# Patient Record
Sex: MALE | Race: WHITE | NOT HISPANIC OR LATINO | Employment: UNEMPLOYED | URBAN - METROPOLITAN AREA
[De-identification: names, ages, dates, MRNs, and addresses within clinical notes are randomized per-mention and may not be internally consistent; named-entity substitution may affect disease eponyms.]

---

## 2017-08-14 ENCOUNTER — OFFICE VISIT (OUTPATIENT)
Dept: URGENT CARE | Facility: CLINIC | Age: 27
End: 2017-08-14
Payer: COMMERCIAL

## 2017-08-14 VITALS
RESPIRATION RATE: 16 BRPM | BODY MASS INDEX: 25.93 KG/M2 | OXYGEN SATURATION: 98 % | TEMPERATURE: 98.3 F | SYSTOLIC BLOOD PRESSURE: 100 MMHG | HEART RATE: 62 BPM | WEIGHT: 202 LBS | HEIGHT: 74 IN | DIASTOLIC BLOOD PRESSURE: 78 MMHG

## 2017-08-14 DIAGNOSIS — M62.838 TRAPEZIUS MUSCLE SPASM: ICD-10-CM

## 2017-08-14 PROCEDURE — 99202 OFFICE O/P NEW SF 15 MIN: CPT | Performed by: NURSE PRACTITIONER

## 2017-08-14 ASSESSMENT — ENCOUNTER SYMPTOMS
DIZZINESS: 0
BACK PAIN: 0
FEVER: 0
DIARRHEA: 0
NAUSEA: 0
MYALGIAS: 0
VOMITING: 0
NECK PAIN: 0
COUGH: 0
CHILLS: 0
HEADACHES: 0

## 2017-08-14 NOTE — PROGRESS NOTES
"Subjective:      Chris Boudreaux is a 27 y.o. male who presents with Letter for School/Work  Surgical HX reviewed in [facility] and not pertinent to today's visit  History reviewed. No pertinent past medical history.  Current medications reviewed.  Social History   Substance Use Topics   • Smoking status: Never Smoker    • Smokeless tobacco: Not on file      Comment: 4 YEARS AGO   • Alcohol Use: No               HPI  Chief Complaint   Patient presents with   • Letter for School/Work     need medical release for right shoulder, was on wc, denied had to use insurance now work requesting medical release to work   approx a month ago he woke up with right shoulder and neck pain. He was sent to a work comp provider and did some physical therapy and anti inflammatories. He states that the claim was denies hence follow up with UC. He no pain, no numbness or tingling and full ROM. He is not currently taking anything for the pain. No hx of same. No other aggravating or alleviating factors.     Review of Systems   Constitutional: Negative for fever, chills and malaise/fatigue.   Respiratory: Negative for cough.    Gastrointestinal: Negative for nausea, vomiting and diarrhea.   Musculoskeletal: Negative for myalgias, back pain, joint pain and neck pain.   Skin: Negative for rash.   Neurological: Negative for dizziness and headaches.   All other systems reviewed and are negative.         Objective:     /78 mmHg  Pulse 62  Temp(Src) 36.8 °C (98.3 °F)  Resp 16  Ht 1.88 m (6' 2.02\")  Wt 91.627 kg (202 lb)  BMI 25.92 kg/m2  SpO2 98%     Physical Exam   Constitutional: He is oriented to person, place, and time. He appears well-developed and well-nourished. No distress.   HENT:   Head: Normocephalic.   Right Ear: External ear normal.   Left Ear: External ear normal.   Eyes: EOM are normal.   Neck: Normal range of motion.   Pulmonary/Chest: Effort normal. No respiratory distress.   Musculoskeletal: Normal range of motion. "   Full ROM of right shoulder  No tenderness with palpation  Upper extremity strength 5/5 and equal    Neurological: He is alert and oriented to person, place, and time.   Skin: Skin is warm and dry.   Psychiatric: He has a normal mood and affect.   Nursing note and vitals reviewed.              Assessment/Plan:     1. Trapezius muscle spasm       Work release  Discussed if any return of sx - continue PT exercised, NSAIDS and ice    Please make an appointment for follow up with your primary care provider. Return for any change in condition, worsening of symptoms, or any other concerns. Please go to the nearest emergency room for any shortness of breath or chest pain or for any of the emergent conditions we have discussed. Patient states understanding to plan of care and discharge instructions.    Please note that this dictation was created using voice recognition software. I have worked with consultants from the vendor as well as technical experts from [facility] [employer] to optimize the interface. I have made every reasonable attempt to correct obvious errors, but I expect that there are errors of grammar and possibly content that I did not discover before finalizing the note.

## 2017-08-14 NOTE — MR AVS SNAPSHOT
"        Chris Adamkilliancathie   2017 1:45 PM   Office Visit   MRN: [id number]    Department:  [facility]   Dept Phone:  [phone]    Description:  Male : 1990   Provider:  RAKAN Jang           Reason for Visit     Letter for School/Work need medical release for right shoulder, was on wc, denied had to use insurance now work requesting medical release to work      Allergies as of 2017     Allergen Noted Reactions    Pcn [Penicillins] 2010         You were diagnosed with     Trapezius muscle spasm   [[id number]]         Vital Signs     Blood Pressure Pulse Temperature Respirations Height Weight    100/78 mmHg 62 36.8 °C (98.3 °F) 16 1.88 m (6' 2.02\") 91.627 kg (202 lb)    Body Mass Index Oxygen Saturation Smoking Status             25.92 kg/m2 98% Never Smoker          Basic Information     Date Of Birth Sex Race Ethnicity Preferred Language    1990 Male White Unknown English      Health Maintenance     Patient has no pending health maintenance at this time      Current Immunizations     No immunizations on file.      Below and/or attached are the medications your provider expects you to take. Review all of your home medications and newly ordered medications with your provider and/or pharmacist. Follow medication instructions as directed by your provider and/or pharmacist. Please keep your medication list with you and share with your provider. Update the information when medications are discontinued, doses are changed, or new medications (including over-the-counter products) are added; and carry medication information at all times in the event of emergency situations     Allergies:  PCN - (reactions not documented)               Medications  Valid as of: 2017 -  2:04 PM    Generic Name Brand Name Tablet Size Instructions for use    Hydrocodone-Acetaminophen (Tab) VICODIN 5-500 MG Take 1-2 Tabs by mouth every four hours as needed.        .                 "   Medicines prescribed today were sent to:     None      Medication refill instructions:       If your prescription bottle indicates you have medication refills left, it is not necessary to call your provider’s office. Please contact your pharmacy and they will refill your medication.    If your prescription bottle indicates you do not have any refills left, you may request refills at any time through one of the following ways: The online Stroho system (except Urgent Care), by calling your provider’s office, or by asking your pharmacy to contact your provider’s office with a refill request. Medication refills are processed only during regular business hours and may not be available until the next business day. Your provider may request additional information or to have a follow-up visit with you prior to refilling your medication.   *Please Note: Medication refills are assigned a new Rx number when refilled electronically. Your pharmacy may indicate that no refills were authorized even though a new prescription for the same medication is available at the pharmacy. Please request the medicine by name with the pharmacy before contacting your provider for a refill.           Stroho Access Code: [email or web address]  Expires: 9/13/2017  2:04 PM    Stroho  A secure, online tool to manage your health information     [employer]’s Stroho® is a secure, online tool that connects you to your personalized health information from the privacy of your home -- day or night - making it very easy for you to manage your healthcare. Once the activation process is completed, you can even access your medical information using the Stroho power, which is available for free in the [employer] Power store or Google Play store.     Stroho provides the following levels of access (as shown below):   My Chart Features   [facility] Doctor [facility]  Specialists [facility]  Urgent  Care Non-[facility]  Primary Care  Doctor   Email your healthcare team  securely and privately 24/7 X X X    Manage appointments: schedule your next appointment; view details of past/upcoming appointments X      Request prescription refills. X      View recent personal medical records, including lab and immunizations X X X X   View health record, including health history, allergies, medications X X X X   Read reports about your outpatient visits, procedures, consult and ER notes X X X X   See your discharge summary, which is a recap of your hospital and/or ER visit that includes your diagnosis, lab results, and care plan. X X       How to register for frintit:  1. Go to  [email or web address].  2. Click on the Sign Up Now box, which takes you to the New Member Sign Up page. You will need to provide the following information:  a. Enter your frintit Access Code exactly as it appears at the top of this page. (You will not need to use this code after you’ve completed the sign-up process. If you do not sign up before the expiration date, you must request a new code.)   b. Enter your date of birth.   c. Enter your home email address.   d. Click Submit, and follow the next screen’s instructions.  3. Create a frintit ID. This will be your frintit login ID and cannot be changed, so think of one that is secure and easy to remember.  4. Create a frintit password. You can change your password at any time.  5. Enter your Password Reset Question and Answer. This can be used at a later time if you forget your password.   6. Enter your e-mail address. This allows you to receive e-mail notifications when new information is available in frintit.  7. Click Sign Up. You can now view your health information.    For assistance activating your frintit account, call [phone]

## 2017-08-14 NOTE — Clinical Note
August 14, 2017         Patient: Chris Boudreaux   YOB: 1990   Date of Visit: 8/14/2017           To Whom it May Concern:    Chris Boudreaux was seen in my clinic on 8/14/2017. He may return to work on 8/14/17 without restriction..    If you have any questions or concerns, please don't hesitate to call.        Sincerely,           Taylor Vaughn A.P.N.  Electronically Signed

## 2021-01-13 ENCOUNTER — NON-PROVIDER VISIT (OUTPATIENT)
Dept: URGENT CARE | Facility: CLINIC | Age: 31
End: 2021-01-13

## 2021-01-13 DIAGNOSIS — Z02.1 PRE-EMPLOYMENT DRUG SCREENING: ICD-10-CM

## 2021-01-13 LAB
AMP AMPHETAMINE: NORMAL
COC COCAINE: NORMAL
INT CON NEG: NORMAL
INT CON POS: NORMAL
MET METHAMPHETAMINES: NORMAL
OPI OPIATES: NORMAL
PCP PHENCYCLIDINE: NORMAL
POC DRUG COMMENT 753798-OCCUPATIONAL HEALTH: NORMAL
THC: NORMAL

## 2021-01-13 PROCEDURE — 80305 DRUG TEST PRSMV DIR OPT OBS: CPT | Performed by: NURSE PRACTITIONER

## 2021-01-21 ENCOUNTER — NON-PROVIDER VISIT (OUTPATIENT)
Dept: OCCUPATIONAL MEDICINE | Facility: CLINIC | Age: 31
End: 2021-01-21

## 2021-01-21 DIAGNOSIS — Z02.83 ENCOUNTER FOR DRUG SCREENING: ICD-10-CM

## 2021-01-21 PROCEDURE — 8911 PR MRO FEE: Performed by: NURSE PRACTITIONER

## 2021-08-25 ENCOUNTER — TELEPHONE (OUTPATIENT)
Dept: SCHEDULING | Facility: IMAGING CENTER | Age: 31
End: 2021-08-25

## 2021-09-30 ENCOUNTER — OFFICE VISIT (OUTPATIENT)
Dept: MEDICAL GROUP | Facility: MEDICAL CENTER | Age: 31
End: 2021-09-30
Attending: NURSE PRACTITIONER
Payer: MEDICAID

## 2021-09-30 VITALS
HEIGHT: 75 IN | OXYGEN SATURATION: 97 % | SYSTOLIC BLOOD PRESSURE: 102 MMHG | HEART RATE: 68 BPM | WEIGHT: 207.3 LBS | BODY MASS INDEX: 25.78 KG/M2 | DIASTOLIC BLOOD PRESSURE: 68 MMHG | TEMPERATURE: 97.6 F | RESPIRATION RATE: 16 BRPM

## 2021-09-30 DIAGNOSIS — G89.29 CHRONIC PAIN OF BOTH SHOULDERS: ICD-10-CM

## 2021-09-30 DIAGNOSIS — Z13.6 SCREENING FOR CARDIOVASCULAR CONDITION: ICD-10-CM

## 2021-09-30 DIAGNOSIS — Z11.3 ROUTINE SCREENING FOR STI (SEXUALLY TRANSMITTED INFECTION): ICD-10-CM

## 2021-09-30 DIAGNOSIS — M54.6 CHRONIC BILATERAL THORACIC BACK PAIN: ICD-10-CM

## 2021-09-30 DIAGNOSIS — M25.511 CHRONIC PAIN OF BOTH SHOULDERS: ICD-10-CM

## 2021-09-30 DIAGNOSIS — M25.571 CHRONIC PAIN OF RIGHT ANKLE: ICD-10-CM

## 2021-09-30 DIAGNOSIS — R10.9 STOMACH PAIN: ICD-10-CM

## 2021-09-30 DIAGNOSIS — Z13.228 SCREENING FOR METABOLIC DISORDER: ICD-10-CM

## 2021-09-30 DIAGNOSIS — Z76.89 ENCOUNTER TO ESTABLISH CARE: ICD-10-CM

## 2021-09-30 DIAGNOSIS — G89.29 CHRONIC PAIN OF RIGHT ANKLE: ICD-10-CM

## 2021-09-30 DIAGNOSIS — G89.29 CHRONIC BILATERAL THORACIC BACK PAIN: ICD-10-CM

## 2021-09-30 DIAGNOSIS — M54.2 NECK PAIN: ICD-10-CM

## 2021-09-30 DIAGNOSIS — M25.512 CHRONIC PAIN OF BOTH SHOULDERS: ICD-10-CM

## 2021-09-30 PROCEDURE — 99212 OFFICE O/P EST SF 10 MIN: CPT | Performed by: NURSE PRACTITIONER

## 2021-09-30 PROCEDURE — 99204 OFFICE O/P NEW MOD 45 MIN: CPT | Performed by: NURSE PRACTITIONER

## 2021-09-30 RX ORDER — OMEPRAZOLE 20 MG/1
20 TABLET, DELAYED RELEASE ORAL DAILY
Qty: 30 TABLET | Refills: 2 | Status: SHIPPED | OUTPATIENT
Start: 2021-09-30 | End: 2021-10-21

## 2021-09-30 ASSESSMENT — ENCOUNTER SYMPTOMS
SHORTNESS OF BREATH: 0
FEVER: 0
PALPITATIONS: 0
CHILLS: 0
ABDOMINAL PAIN: 1
HEARTBURN: 1
COUGH: 0
DIARRHEA: 0
VOMITING: 0
NAUSEA: 0
WHEEZING: 0
WEIGHT LOSS: 0
CONSTIPATION: 0
BLOOD IN STOOL: 1

## 2021-09-30 ASSESSMENT — PATIENT HEALTH QUESTIONNAIRE - PHQ9: CLINICAL INTERPRETATION OF PHQ2 SCORE: 0
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
## 2021-09-30 NOTE — ASSESSMENT & PLAN NOTE
He reports that he woke up a year ago with anterior right ankle pain and tension.  He did not injure the joint.  He limps always now- some days worse than others.  He denies swelling, redness, warmth.  He feels the anterior tension with flexion of the ankle and medial rotation.
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
## 2021-09-30 NOTE — ASSESSMENT & PLAN NOTE
Started about 6 months ago with a burning abd pain.  He reports that spicy or heavy seasoning will cause the burning in his stomach.  He will sometimes have BRBPR when wiping.  He has 4-5 BMs per day.  He has black tarry stool in the past. He has not tried any treatment.

## 2021-10-01 ENCOUNTER — HOSPITAL ENCOUNTER (OUTPATIENT)
Dept: RADIOLOGY | Facility: MEDICAL CENTER | Age: 31
End: 2021-10-01
Attending: NURSE PRACTITIONER
Payer: MEDICAID

## 2021-10-01 DIAGNOSIS — M54.6 CHRONIC BILATERAL THORACIC BACK PAIN: ICD-10-CM

## 2021-10-01 DIAGNOSIS — M25.512 CHRONIC PAIN OF BOTH SHOULDERS: ICD-10-CM

## 2021-10-01 DIAGNOSIS — G89.29 CHRONIC PAIN OF RIGHT ANKLE: ICD-10-CM

## 2021-10-01 DIAGNOSIS — G89.29 CHRONIC PAIN OF BOTH SHOULDERS: ICD-10-CM

## 2021-10-01 DIAGNOSIS — M54.2 NECK PAIN: ICD-10-CM

## 2021-10-01 DIAGNOSIS — G89.29 CHRONIC BILATERAL THORACIC BACK PAIN: ICD-10-CM

## 2021-10-01 DIAGNOSIS — M25.511 CHRONIC PAIN OF BOTH SHOULDERS: ICD-10-CM

## 2021-10-01 DIAGNOSIS — M25.571 CHRONIC PAIN OF RIGHT ANKLE: ICD-10-CM

## 2021-10-01 PROCEDURE — 72040 X-RAY EXAM NECK SPINE 2-3 VW: CPT

## 2021-10-01 PROCEDURE — 73030 X-RAY EXAM OF SHOULDER: CPT | Mod: LT

## 2021-10-01 PROCEDURE — 73600 X-RAY EXAM OF ANKLE: CPT | Mod: RT

## 2021-10-01 PROCEDURE — 73030 X-RAY EXAM OF SHOULDER: CPT | Mod: RT

## 2021-10-01 PROCEDURE — 72072 X-RAY EXAM THORAC SPINE 3VWS: CPT

## 2021-10-07 NOTE — RESULT ENCOUNTER NOTE
Ramírez Perez-     I reviewed all of your x-rays-they are normal.  I think we should start with physical therapy and then consider an MRI if you do not see improvement after your 6 sessions of physical therapy.Amanda    Call to schedule with physical therapy-[facility]  [address]  [address]  [facility] [place] [address]  Phone: [phone]  Fax: [phone]

## 2021-10-07 NOTE — RESULT ENCOUNTER NOTE
Ramírez Perez-       Your neck x-ray shows some very mild arthritis where your your cervical (neck) spine meets your thoracic spine.  Physical therapy would be a good first choice for this.Amanda

## 2021-10-17 ENCOUNTER — PATIENT MESSAGE (OUTPATIENT)
Dept: MEDICAL GROUP | Facility: MEDICAL CENTER | Age: 31
End: 2021-10-17

## 2021-10-17 DIAGNOSIS — M54.2 NECK PAIN: ICD-10-CM

## 2021-10-20 RX ORDER — CYCLOBENZAPRINE HCL 5 MG
5-10 TABLET ORAL 3 TIMES DAILY PRN
Qty: 60 TABLET | Refills: 2 | Status: SHIPPED | OUTPATIENT
Start: 2021-10-20 | End: 2021-11-18 | Stop reason: SDUPTHER

## 2021-10-21 ENCOUNTER — OFFICE VISIT (OUTPATIENT)
Dept: URGENT CARE | Facility: CLINIC | Age: 31
End: 2021-10-21
Payer: MEDICAID

## 2021-10-21 VITALS
HEIGHT: 75 IN | WEIGHT: 210 LBS | TEMPERATURE: 97.1 F | BODY MASS INDEX: 26.11 KG/M2 | RESPIRATION RATE: 16 BRPM | DIASTOLIC BLOOD PRESSURE: 74 MMHG | HEART RATE: 64 BPM | SYSTOLIC BLOOD PRESSURE: 116 MMHG | OXYGEN SATURATION: 98 %

## 2021-10-21 DIAGNOSIS — J06.9 URI, ACUTE: ICD-10-CM

## 2021-10-21 LAB
EXTERNAL QUALITY CONTROL: NORMAL
SARS-COV+SARS-COV-2 AG RESP QL IA.RAPID: NEGATIVE

## 2021-10-21 PROCEDURE — 87426 SARSCOV CORONAVIRUS AG IA: CPT | Performed by: NURSE PRACTITIONER

## 2021-10-21 PROCEDURE — 99213 OFFICE O/P EST LOW 20 MIN: CPT | Performed by: NURSE PRACTITIONER

## 2021-10-21 RX ORDER — OMEPRAZOLE 20 MG/1
CAPSULE, DELAYED RELEASE ORAL
COMMUNITY
Start: 2021-10-05 | End: 2022-09-29

## 2021-10-21 ASSESSMENT — ENCOUNTER SYMPTOMS
CHILLS: 0
FEVER: 0
COUGH: 1

## 2021-10-21 NOTE — PROGRESS NOTES
Subjective     Chris Boudreaux is a 31 y.o. male who presents with Coronavirus Screening (pt has loss of  taste and smell, fever, congestion x 8 days )    No past medical history on file.  Social History     Socioeconomic History   • Marital status: Single     Spouse name: Not on file   • Number of children: Not on file   • Years of education: Not on file   • Highest education level: Not on file   Occupational History   • Not on file   Tobacco Use   • Smoking status: Never Smoker   • Smokeless tobacco: Never Used   • Tobacco comment: 4 YEARS AGO   Vaping Use   • Vaping Use: Never used   Substance and Sexual Activity   • Alcohol use: No     Comment: occ.   • Drug use: Yes     Frequency: 7.0 times per week     Types: Marijuana, Inhaled, Oral   • Sexual activity: Yes     Partners: Female     Birth control/protection: Condom   Other Topics Concern   • Not on file   Social History Narrative   • Not on file     Social Determinants of Health     Financial Resource Strain:    • Difficulty of Paying Living Expenses:    Food Insecurity:    • Worried About Running Out of Food in the Last Year:    • Ran Out of Food in the Last Year:    Transportation Needs:    • Lack of Transportation (Medical):    • Lack of Transportation (Non-Medical):    Physical Activity:    • Days of Exercise per Week:    • Minutes of Exercise per Session:    Stress:    • Feeling of Stress :    Social Connections:    • Frequency of Communication with Friends and Family:    • Frequency of Social Gatherings with Friends and Family:    • Attends Muslim Services:    • Active Member of Clubs or Organizations:    • Attends Club or Organization Meetings:    • Marital Status:    Intimate Partner Violence:    • Fear of Current or Ex-Partner:    • Emotionally Abused:    • Physically Abused:    • Sexually Abused:      Family History   Problem Relation Age of Onset   • Multiple Sclerosis Mother         possibly   • Multiple Sclerosis Father    • Parkinson's  "Disease Father    • Stroke Maternal Grandmother    • Diabetes Maternal Grandfather    • Diabetes Paternal Grandfather    • Cancer Neg Hx        Allergies: Pcn [penicillins]    Patient is a 31-year-old male who presents today with complaint of upper respiratory symptoms including nasal congestion, cough, fatigue and malaise.  He has also noted loss of taste and smell.  Onset of symptoms a days ago.  He is requesting test for Covid.          URI   This is a new problem. The current episode started 1 to 4 weeks ago. The problem has been unchanged. There has been no fever. Associated symptoms include congestion and coughing. Associated symptoms comments: Loss of taste and smell. He has tried nothing for the symptoms. The treatment provided no relief.       Review of Systems   Constitutional: Positive for malaise/fatigue. Negative for chills and fever.   HENT: Positive for congestion.    Respiratory: Positive for cough.    Skin: Negative.    All other systems reviewed and are negative.             Objective     /74 (BP Location: Right arm, Patient Position: Sitting, BP Cuff Size: Adult)   Pulse 64   Temp 36.2 °C (97.1 °F) (Temporal)   Resp 16   Ht 1.905 m (6' 3\")   Wt 95.3 kg (210 lb)   SpO2 98%   BMI 26.25 kg/m²      Physical Exam  Vitals reviewed.   Constitutional:       Appearance: Normal appearance.   HENT:      Head: Normocephalic.      Right Ear: Tympanic membrane, ear canal and external ear normal.      Left Ear: Tympanic membrane, ear canal and external ear normal.      Nose: Congestion present.      Mouth/Throat:      Mouth: Mucous membranes are moist.   Eyes:      Extraocular Movements: Extraocular movements intact.      Conjunctiva/sclera: Conjunctivae normal.      Pupils: Pupils are equal, round, and reactive to light.   Cardiovascular:      Rate and Rhythm: Normal rate and regular rhythm.      Heart sounds: Normal heart sounds.   Pulmonary:      Effort: Pulmonary effort is normal.      Breath " sounds: Normal breath sounds.   Musculoskeletal:         General: Normal range of motion.      Cervical back: Normal range of motion and neck supple.   Skin:     General: Skin is warm and dry.   Neurological:      Mental Status: He is alert and oriented to person, place, and time.   Psychiatric:         Mood and Affect: Mood normal.         Behavior: Behavior normal.                  poct covid: negative            Assessment & Plan   Viral URI    Over-the-counter cough/cold medication of choice  Push fluids  Tylenol/Motrin as needed  Return to urgent care for worsening of symptoms  Patient declined confirmatory Covid PCR at this time     There are no diagnoses linked to this encounter.

## 2021-11-01 ENCOUNTER — PHYSICAL THERAPY (OUTPATIENT)
Dept: PHYSICAL THERAPY | Facility: REHABILITATION | Age: 31
End: 2021-11-01
Attending: NURSE PRACTITIONER
Payer: MEDICAID

## 2021-11-01 DIAGNOSIS — M54.6 CHRONIC BILATERAL THORACIC BACK PAIN: ICD-10-CM

## 2021-11-01 DIAGNOSIS — M25.512 CHRONIC PAIN OF BOTH SHOULDERS: ICD-10-CM

## 2021-11-01 DIAGNOSIS — M54.2 NECK PAIN: ICD-10-CM

## 2021-11-01 DIAGNOSIS — G89.29 CHRONIC PAIN OF BOTH SHOULDERS: ICD-10-CM

## 2021-11-01 DIAGNOSIS — M25.571 CHRONIC PAIN OF RIGHT ANKLE: ICD-10-CM

## 2021-11-01 DIAGNOSIS — M25.511 CHRONIC PAIN OF BOTH SHOULDERS: ICD-10-CM

## 2021-11-01 DIAGNOSIS — G89.29 CHRONIC BILATERAL THORACIC BACK PAIN: ICD-10-CM

## 2021-11-01 DIAGNOSIS — G89.29 CHRONIC PAIN OF RIGHT ANKLE: ICD-10-CM

## 2021-11-01 PROCEDURE — 97162 PT EVAL MOD COMPLEX 30 MIN: CPT

## 2021-11-01 SDOH — ECONOMIC STABILITY: GENERAL: QUALITY OF LIFE: FAIR

## 2021-11-01 ASSESSMENT — ENCOUNTER SYMPTOMS
QUALITY: BURNING
ALLEVIATING FACTORS: REST
EXACERBATED BY: SLEEPING
EXACERBATED BY: ACTIVITY
EXACERBATED BY: STANDING
QUALITY: SHARP
QUALITY: ACHING
ALLEVIATING FACTORS: HEAT APPLICATION
PAIN TIMING: CONSTANT
PAIN SCALE AT LOWEST: 4
PAIN LOCATION: NECK
EXACERBATED BY: RAPID POSITION CHANGES
EXACERBATED BY: GRIPPING
QUALITY: NUMBNESS
EXACERBATED BY: CARRYING
QUALITY: TINGLING
PAIN TIMING: ALL DAY
PAIN SCALE AT HIGHEST: 10
EXACERBATED BY: HOUSEWORK
PAIN SCALE: 7
EXACERBATED BY: MOVEMENT
EXACERBATED BY: OVERHEAD ACTIVITY
EXACERBATED BY: LIFTING
EXACERBATED BY: DRESSING
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
## 2021-11-03 DIAGNOSIS — R29.898 RIGHT ARM WEAKNESS: ICD-10-CM

## 2021-11-03 DIAGNOSIS — M54.2 NECK PAIN: ICD-10-CM
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
## 2021-11-11 ENCOUNTER — PHYSICAL THERAPY (OUTPATIENT)
Dept: PHYSICAL THERAPY | Facility: REHABILITATION | Age: 31
End: 2021-11-11
Attending: NURSE PRACTITIONER
Payer: MEDICAID

## 2021-11-11 DIAGNOSIS — G89.29 CHRONIC PAIN OF BOTH SHOULDERS: ICD-10-CM

## 2021-11-11 DIAGNOSIS — G89.29 CHRONIC BILATERAL THORACIC BACK PAIN: ICD-10-CM

## 2021-11-11 DIAGNOSIS — M54.2 NECK PAIN: ICD-10-CM

## 2021-11-11 DIAGNOSIS — M54.6 CHRONIC BILATERAL THORACIC BACK PAIN: ICD-10-CM

## 2021-11-11 DIAGNOSIS — M25.511 CHRONIC PAIN OF BOTH SHOULDERS: ICD-10-CM

## 2021-11-11 DIAGNOSIS — M25.512 CHRONIC PAIN OF BOTH SHOULDERS: ICD-10-CM

## 2021-11-11 PROCEDURE — 97110 THERAPEUTIC EXERCISES: CPT

## 2021-11-11 PROCEDURE — 97014 ELECTRIC STIMULATION THERAPY: CPT
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
## 2021-11-12 ENCOUNTER — HOSPITAL ENCOUNTER (OUTPATIENT)
Dept: RADIOLOGY | Facility: MEDICAL CENTER | Age: 31
End: 2021-11-12
Attending: NURSE PRACTITIONER
Payer: MEDICAID

## 2021-11-12 DIAGNOSIS — R29.898 RIGHT ARM WEAKNESS: ICD-10-CM

## 2021-11-12 DIAGNOSIS — M54.2 NECK PAIN: ICD-10-CM

## 2021-11-12 PROCEDURE — 72141 MRI NECK SPINE W/O DYE: CPT

## 2021-11-15 ENCOUNTER — APPOINTMENT (OUTPATIENT)
Dept: PHYSICAL THERAPY | Facility: REHABILITATION | Age: 31
End: 2021-11-15
Attending: NURSE PRACTITIONER
Payer: MEDICAID

## 2021-11-15 NOTE — RESULT ENCOUNTER NOTE
Ramírez Perez-I reviewed your MRI. You have some breakdown of the disc between your vertebra at C6-C7 and some enlargement of the joints with in your spine. I would like you to establish with [facility] if you have not already done so. I have placed their information below if you needed.Amanda SALAZAR - [address].  [address] [address]  Phone: [phone]   Fax: [phone]

## 2021-11-18 ENCOUNTER — PHYSICAL THERAPY (OUTPATIENT)
Dept: PHYSICAL THERAPY | Facility: REHABILITATION | Age: 31
End: 2021-11-18
Attending: NURSE PRACTITIONER
Payer: MEDICAID

## 2021-11-18 DIAGNOSIS — M25.511 CHRONIC PAIN OF BOTH SHOULDERS: ICD-10-CM

## 2021-11-18 DIAGNOSIS — G89.29 CHRONIC PAIN OF BOTH SHOULDERS: ICD-10-CM

## 2021-11-18 DIAGNOSIS — M25.512 CHRONIC PAIN OF BOTH SHOULDERS: ICD-10-CM

## 2021-11-18 DIAGNOSIS — M54.6 CHRONIC BILATERAL THORACIC BACK PAIN: ICD-10-CM

## 2021-11-18 DIAGNOSIS — G89.29 CHRONIC BILATERAL THORACIC BACK PAIN: ICD-10-CM

## 2021-11-18 DIAGNOSIS — M54.2 NECK PAIN: ICD-10-CM

## 2021-11-18 PROCEDURE — 97110 THERAPEUTIC EXERCISES: CPT

## 2021-11-18 PROCEDURE — 97014 ELECTRIC STIMULATION THERAPY: CPT

## 2021-11-18 NOTE — OP THERAPY DAILY TREATMENT
Outpatient Physical Therapy  DAILY TREATMENT     [facility] Physical Therapy [address].  [address]  [place] [address]  Phone:  [phone]  Fax:  [phone]    Date: 11/18/2021    Patient: Chris Boudreaux  YOB: 1990  MRN: [id number]     Time Calculation    Start time: 1015  Stop time: 1110 Time Calculation (min): 55 minutes         Chief Complaint: Back Problem and Neck Pain    Visit #: 3    SUBJECTIVE:  Pt reports that yesterday, the muscles on each side of upper thoracic spine were tender and sore due to driving an hour and a half.  States his spine felt looser, less tense after last PT session.  Had R shoulder soreness following PT visit.  Distal symptoms unchanged.  Pt had MRI, see results below.  Referring provider referred pt to [facility].    OBJECTIVE:    MRI on 11/12: C7-T1: Disc desiccation, mild posterior disc bulge and annular fissure. No spinal canal narrowing. There is moderate right neuroforaminal narrowing due to uncovertebral hypertrophy.     Therapeutic Exercises (CPT 93908):     1. Supine cervical retraction, 5sec x10, cues for form, feels pain and tightness at sub occ mm when performing correctly    2. Scap retraction against wall, x10, cues to avoid excess UT use    3. Median nerve glide with ipsi cervical lateral flexion, x10, limited elbow extension in nerve glide, but with ipsilateral neck flexion allows full ROM.    4. Supine wand bicep curls, 10, right thoracic pain between spine and scap    5. Supine wand chest press, x10    6. Supine wand AAROM ER, x10    7. Ball circles at wall, x10R CW/CCW    8. Ball roll up wall- RUE, x8    13. Manual cervical traction GrII, STM B upper 1/2 thoracic paraspinals, UTs, LSs,     14. Thoracic mobs (PAs T3-T7 GrIII)      Therapeutic Exercise Summary: Add median nerve glide to HEP, handout provided.  Reviewed MRI findings, education provided re: anatomy, pathology, using skeletal model, and correlation with  symptoms.    Therapeutic Treatments and Modalities:     1. E Stim Unattended (CPT 76143), IFC and MH to B cervical and upper thoracic spine, x15 min in hooklying    Time-based treatments/modalities:    Physical Therapy Timed Treatment Charges  Therapeutic exercise minutes (CPT 03659): 38 minutes    ASSESSMENT:   Response to treatment: Pt demo fair kristina for today's interventions, minimal distal symptoms provoked.    PLAN/RECOMMENDATIONS:   Plan for treatment: therapy treatment to continue next visit.  Planned interventions for next visit: continue with current treatment.

## 2021-11-30 ENCOUNTER — APPOINTMENT (OUTPATIENT)
Dept: PHYSICAL THERAPY | Facility: REHABILITATION | Age: 31
End: 2021-11-30
Attending: NURSE PRACTITIONER
Payer: MEDICAID

## 2021-12-02 ENCOUNTER — APPOINTMENT (OUTPATIENT)
Dept: PHYSICAL THERAPY | Facility: REHABILITATION | Age: 31
End: 2021-12-02
Attending: NURSE PRACTITIONER
Payer: MEDICAID

## 2021-12-07 ENCOUNTER — PHYSICAL THERAPY (OUTPATIENT)
Dept: PHYSICAL THERAPY | Facility: REHABILITATION | Age: 31
End: 2021-12-07
Attending: NURSE PRACTITIONER
Payer: MEDICAID

## 2021-12-07 DIAGNOSIS — M25.511 CHRONIC PAIN OF BOTH SHOULDERS: ICD-10-CM

## 2021-12-07 DIAGNOSIS — G89.29 CHRONIC PAIN OF BOTH SHOULDERS: ICD-10-CM

## 2021-12-07 DIAGNOSIS — G89.29 CHRONIC BILATERAL THORACIC BACK PAIN: ICD-10-CM

## 2021-12-07 DIAGNOSIS — M25.512 CHRONIC PAIN OF BOTH SHOULDERS: ICD-10-CM

## 2021-12-07 DIAGNOSIS — M54.6 CHRONIC BILATERAL THORACIC BACK PAIN: ICD-10-CM

## 2021-12-07 DIAGNOSIS — M54.2 NECK PAIN: ICD-10-CM

## 2021-12-07 PROCEDURE — 97110 THERAPEUTIC EXERCISES: CPT

## 2021-12-07 PROCEDURE — 97014 ELECTRIC STIMULATION THERAPY: CPT

## 2021-12-07 NOTE — OP THERAPY DAILY TREATMENT
"  Outpatient Physical Therapy  DAILY TREATMENT     [facility] Physical [address].  [address]  [place] [address]  Phone:  [phone]  Fax:  [phone]    Date: 12/07/2021    Patient: Chris Boudreaux  YOB: 1990  MRN: [id number]     Time Calculation    Start time: 0946  Stop time: 1045 Time Calculation (min): 59 minutes         Chief Complaint: Arm Problem and Neck Problem    Visit #: 4    SUBJECTIVE:  The pt states that he is noticing an improvement in his arm symptoms since starting physical therapy. He reports that he is doing his HEP 2x/day. He states that he feels a tingling in his neck and an \"emptiness\" down his arm. He states that he got a referral to the spine specialist, but there is a 90 day waiting list to get an appointment.  He does report that he was ice skating this weekend with his kids and fell and has been feeling some discomfort in his L arm ever since.     OBJECTIVE:          Therapeutic Exercises (CPT 94988):     1. Supine cervical retraction, 2x10    2. Scap retraction against wall, x15     3. Median nerve glide with ipsi cervical lateral flexion, 2x10    4. Supine wand bicep curls, 2x10    5. Supine wand chest press, 2x10    6. Supine shoulder ER AROM, 2x10    7. Ball circles at wall, 30x each CW/CCW    8. Ball roll up wall- RUE, x15, Yellow ball    9. UBE warm up , L2, 5 minutes    10. T-band rows, 2x10, Black t-band    11. B shoulder ER with t-band , Deferred due to L shoulder pain, 1/2 foam roll    13. Manual cervical traction GrII, STM B upper 1/2 thoracic paraspinals, UTs, LSs,     20. POC 10/13/21-12/31/21      Therapeutic Exercise Summary: Add median nerve glide to HEP, handout provided.  Reviewed MRI findings, education provided re: anatomy, pathology, using skeletal model, and correlation with symptoms.    Therapeutic Treatments and Modalities:     1. E Stim Unattended (CPT 83759), IFC and MH to B cervical and upper thoracic spine, x15 min " in hooklying    Time-based treatments/modalities:    Physical Therapy Timed Treatment Charges  Therapeutic exercise minutes (CPT 05220): 38 minutes    ASSESSMENT:   Response to treatment: The pt continues to tolerate a progressive increase in therex for postural mobility and spinal stabilization without aggravation of his neck or radicular symptoms. He is reporting ongoing benefit from participation in home exercises and is noting improvement in his function and symptoms. The pt will continue to benefit from skilled physical therapy intervention to maximize his return to PLOF.      PLAN/RECOMMENDATIONS:   Plan for treatment: therapy treatment to continue next visit.  Planned interventions for next visit: continue with current treatment. Continue to progress postural mobility, nerve tension, and spinal stabilization.

## 2021-12-10 ENCOUNTER — APPOINTMENT (OUTPATIENT)
Dept: PHYSICAL THERAPY | Facility: REHABILITATION | Age: 31
End: 2021-12-10
Attending: NURSE PRACTITIONER
Payer: MEDICAID

## 2021-12-14 ENCOUNTER — APPOINTMENT (OUTPATIENT)
Dept: PHYSICAL THERAPY | Facility: REHABILITATION | Age: 31
End: 2021-12-14
Attending: NURSE PRACTITIONER
Payer: MEDICAID

## 2021-12-14 NOTE — OP THERAPY DAILY TREATMENT
"  Outpatient Physical Therapy  DAILY TREATMENT     [facility] Physical [address].  [address]  [place] [address]  Phone:  [phone]  Fax:  [phone]    Date: 12/14/2021    Patient: Chris Boudreaux  YOB: 1990  MRN: [id number]     Time Calculation                   Chief Complaint: No chief complaint on file.    Visit #: 5    SUBJECTIVE:  ***    OBJECTIVE:  Current objective measures: ***          Therapeutic Exercises (CPT 09194):     1. Supine cervical retraction, 2x10    2. Scap retraction against wall, x15     3. Median nerve glide with ipsi cervical lateral flexion, 2x10    4. Supine wand bicep curls, 2x10    5. Supine wand chest press, 2x10    6. Supine shoulder ER AROM, 2x10    7. Ball circles at wall, 30x each CW/CCW    8. Ball roll up wall- RUE, x15, Yellow ball    9. UBE warm up , L2, 5 minutes    10. T-band rows, 2x10, Black t-band    11. B shoulder ER with t-band , Deferred due to L shoulder pain, 1/2 foam roll    13. Manual cervical traction GrII, STM B upper 1/2 thoracic paraspinals, UTs, LSs,     20. POC 10/13/21-12/31/21      Therapeutic Exercise Summary: Add median nerve glide to HEP, handout provided.  Reviewed MRI findings, education provided re: anatomy, pathology, using skeletal model, and correlation with symptoms.    Therapeutic Treatments and Modalities:     1. E Stim Unattended (CPT 22009), IFC and MH to B cervical and upper thoracic spine, x15 min in hooklying    Time-based treatments/modalities:           Pain rating (1-10) before treatment:  {PAIN NUMBERS_1-10:65305}  Pain rating (1-10) after treatment:  {PAIN NUMBERS_1-10:29788}    ASSESSMENT:   Response to treatment: ***    PLAN/RECOMMENDATIONS:   Plan for treatment: {AMB OP THERAPY - THERAPY PLAN:[id number]::\"therapy treatment to continue next visit\"}.  Planned interventions for next visit: {PT PLANNED THERAPY INTERVENTIONS:[id number]::\"continue with current treatment\"}.       "

## 2021-12-16 ENCOUNTER — PHYSICAL THERAPY (OUTPATIENT)
Dept: PHYSICAL THERAPY | Facility: REHABILITATION | Age: 31
End: 2021-12-16
Attending: NURSE PRACTITIONER
Payer: MEDICAID

## 2021-12-16 DIAGNOSIS — G89.29 CHRONIC BILATERAL THORACIC BACK PAIN: ICD-10-CM

## 2021-12-16 DIAGNOSIS — M25.511 CHRONIC PAIN OF BOTH SHOULDERS: ICD-10-CM

## 2021-12-16 DIAGNOSIS — M54.6 CHRONIC BILATERAL THORACIC BACK PAIN: ICD-10-CM

## 2021-12-16 DIAGNOSIS — G89.29 CHRONIC PAIN OF BOTH SHOULDERS: ICD-10-CM

## 2021-12-16 DIAGNOSIS — M25.512 CHRONIC PAIN OF BOTH SHOULDERS: ICD-10-CM

## 2021-12-16 DIAGNOSIS — M54.2 NECK PAIN: ICD-10-CM

## 2021-12-16 PROCEDURE — 97110 THERAPEUTIC EXERCISES: CPT

## 2021-12-16 PROCEDURE — 97014 ELECTRIC STIMULATION THERAPY: CPT
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
## 2021-12-20 ENCOUNTER — PHYSICAL THERAPY (OUTPATIENT)
Dept: PHYSICAL THERAPY | Facility: REHABILITATION | Age: 31
End: 2021-12-20
Attending: NURSE PRACTITIONER
Payer: MEDICAID

## 2021-12-20 DIAGNOSIS — G89.29 CHRONIC BILATERAL THORACIC BACK PAIN: ICD-10-CM

## 2021-12-20 DIAGNOSIS — M54.2 NECK PAIN: ICD-10-CM

## 2021-12-20 DIAGNOSIS — M25.511 CHRONIC PAIN OF BOTH SHOULDERS: ICD-10-CM

## 2021-12-20 DIAGNOSIS — M25.512 CHRONIC PAIN OF BOTH SHOULDERS: ICD-10-CM

## 2021-12-20 DIAGNOSIS — M54.6 CHRONIC BILATERAL THORACIC BACK PAIN: ICD-10-CM

## 2021-12-20 DIAGNOSIS — G89.29 CHRONIC PAIN OF BOTH SHOULDERS: ICD-10-CM

## 2021-12-20 PROCEDURE — 97110 THERAPEUTIC EXERCISES: CPT

## 2021-12-20 PROCEDURE — 97014 ELECTRIC STIMULATION THERAPY: CPT

## 2021-12-20 NOTE — OP THERAPY DAILY TREATMENT
"  Outpatient Physical Therapy  DAILY TREATMENT     [facility] Physical [address].  [address]  [place] [address]  Phone:  [phone]  Fax:  [phone]    Date: 12/20/2021    Patient: Chris Boudreaux  YOB: 1990  MRN: [id number]     Time Calculation    Start time: 0805  Stop time: 0900 Time Calculation (min): 55 minutes         Chief Complaint: Neck Problem and Shoulder Problem    Visit #: 6    SUBJECTIVE:  The pt states that his R shoulder and neck symptoms are okay today. He states that he re-strained his L shoulder while roller skating with his kids over the weekend. He is agreeable to PT today.     OBJECTIVE:          Therapeutic Exercises (CPT 36016):     1. Supine cervical retraction, 10x    2. Posture angels against wall, 15x    3. Median nerve glide with ipsi cervical lateral flexion, 15x    4. Supine chin tuck to lift off, 10x    5. Supine wand flexion, 15x    6. T-band ER/IR, 2x10, Black t-band, towel under elbow    7. Ball circles at wall, 30x each CW/CCW, flexion and abduction    8. Ball roll up wall- RUE, 15x3\", Yellow ball    9. UBE warm up , L4, 6 minutes switching anterior to posterior every minute    10. T-band rows, 3x10, Black t-band    11. B shoulder ER with t-band , 2x10, Standing, purple T-band, R only    12. T-band lat pulldown, 3x10, Black t-band    13. Manual cervical traction GrII, STM UTs, LSs, , 5'    20. POC 10/13/21-12/31/21      Therapeutic Exercise Summary: Pt educated to continue with HEP.     Therapeutic Treatments and Modalities:     1. E Stim Unattended (CPT 07486), IFC and MH to B cervical and upper thoracic spine, x15 min in hooklying    Time-based treatments/modalities:    Physical Therapy Timed Treatment Charges  Therapeutic exercise minutes (CPT 28863): 38 minutes    ASSESSMENT:   Response to treatment: The pt continues to tolerate a progression in spinal and postural strengthening and stabilization without aggravation of his " "symptoms. He reports that the \"cold\" feeling in his hand is also improving. He demonstrates good adherence to HEP and is making good progress towards PT goals.     PLAN/RECOMMENDATIONS:   Plan for treatment: therapy treatment to continue next visit.  Planned interventions for next visit: continue with current treatment. Continue with postural stabilization and strengthening.        "

## 2021-12-22 ENCOUNTER — PHYSICAL THERAPY (OUTPATIENT)
Dept: PHYSICAL THERAPY | Facility: REHABILITATION | Age: 31
End: 2021-12-22
Attending: NURSE PRACTITIONER
Payer: MEDICAID

## 2021-12-22 DIAGNOSIS — M25.511 CHRONIC PAIN OF BOTH SHOULDERS: ICD-10-CM

## 2021-12-22 DIAGNOSIS — M25.512 CHRONIC PAIN OF BOTH SHOULDERS: ICD-10-CM

## 2021-12-22 DIAGNOSIS — G89.29 CHRONIC BILATERAL THORACIC BACK PAIN: ICD-10-CM

## 2021-12-22 DIAGNOSIS — G89.29 CHRONIC PAIN OF BOTH SHOULDERS: ICD-10-CM

## 2021-12-22 DIAGNOSIS — M54.6 CHRONIC BILATERAL THORACIC BACK PAIN: ICD-10-CM

## 2021-12-22 DIAGNOSIS — M54.2 NECK PAIN: ICD-10-CM

## 2021-12-22 PROCEDURE — 97014 ELECTRIC STIMULATION THERAPY: CPT

## 2021-12-22 PROCEDURE — 97110 THERAPEUTIC EXERCISES: CPT

## 2021-12-22 NOTE — OP THERAPY DAILY TREATMENT
"  Outpatient Physical Therapy  DAILY TREATMENT     [facility] Physical Therapy [address]  [address]  [place] [address]  Phone:  [phone]  Fax:  [phone]    Date: 12/22/2021    Patient: Chris Boudreaux  YOB: 1990  MRN: [id number]     Time Calculation    Start time: 0815  Stop time: 0915 Time Calculation (min): 60 minutes         Chief Complaint: Neck Pain and Shoulder Problem    Visit #: 7    SUBJECTIVE:  The pt states that he woke up with significant stiffness in his neck. He tried to crack it but states that it felt \"like the bones were rubbing together\". He is agreeable to PT today.     OBJECTIVE:          Therapeutic Exercises (CPT 53091):     1. Supine cervical retraction, 2x10    2. Posture angels against wall, 10x    3. Median nerve glide with ipsi cervical lateral flexion, 15x    4. Supine chin tuck to lift off, Not today    5. Supine wand flexion, 15x    6. T-band ER/IR, 2x10, Black t-band, towel under elbow    7. Ball circles at wall, Not today    8. Ball roll up wall- RUE, 10x    9. UBE warm up , L4, 6 minutes switching anterior to posterior every minute    10. T-band rows, 3x10, Black t-band    11. B shoulder ER with t-band , 2x10, Standing, green T-band, R only    12. T-band lat pulldown, 3x10, Black t-band    13. Manual cervical traction GrII, STM UTs, LSs, , 5'    14. Swiss ball t-s mobs, 2 min    20. POC 10/13/21-12/31/21      Therapeutic Exercise Summary: Pt educated to continue with HEP.     Therapeutic Treatments and Modalities:     1. E Stim Unattended (CPT 27688), IFC and MH to B cervical and upper thoracic spine, x15 min in hooklying    Time-based treatments/modalities:    Physical Therapy Timed Treatment Charges  Therapeutic exercise minutes (CPT 16547): 38 minutes    ASSESSMENT:   Response to treatment: The pt was able to tolerate all therex for postural mobility without an increase in symptoms, despite his aggravation of pain upon arriving to " today's session. His postural strength and B shoulder stability has improved since the start of care.     PLAN/RECOMMENDATIONS:   Plan for treatment: therapy treatment to continue next visit.  Planned interventions for next visit: continue with current treatment. Continue with postural mobility and stabilization.

## 2021-12-24 DIAGNOSIS — M54.2 NECK PAIN: ICD-10-CM

## 2021-12-27 ENCOUNTER — TELEPHONE (OUTPATIENT)
Dept: MEDICAL GROUP | Facility: MEDICAL CENTER | Age: 31
End: 2021-12-27

## 2021-12-27 ENCOUNTER — APPOINTMENT (OUTPATIENT)
Dept: PHYSICAL THERAPY | Facility: REHABILITATION | Age: 31
End: 2021-12-27
Attending: NURSE PRACTITIONER
Payer: MEDICAID

## 2021-12-27 RX ORDER — CYCLOBENZAPRINE HCL 5 MG
5-10 TABLET ORAL 3 TIMES DAILY PRN
Qty: 90 TABLET | Refills: 5 | Status: SHIPPED | OUTPATIENT
Start: 2021-12-27 | End: 2022-09-29

## 2021-12-28 ENCOUNTER — TELEPHONE (OUTPATIENT)
Dept: MEDICAL GROUP | Facility: MEDICAL CENTER | Age: 31
End: 2021-12-28

## 2021-12-28 NOTE — TELEPHONE ENCOUNTER
DOCUMENTATION OF PAR STATUS:    1. Name of Medication & Dose: cyclobenzaprine (FLEXERIL) 5 mg tablet      2. Name of Prescription Coverage Company & phone #: N MEDICA[employer]    3. Date Prior Auth Submitted: 12/27/21    4. What information was given to obtain insurance decision? Chart notes, icd-10 codes, allergies hx.      5. Prior Auth Status? Pending    6. Patient Notified: N\A

## 2021-12-28 NOTE — TELEPHONE ENCOUNTER
FINAL PRIOR AUTHORIZATION STATUS:    1.  Name of Medication & Dose: Cyclobenzaprine 5 mg     2. Prior Auth Status: CANCELLED    3. Action Taken: Pharmacy Notified: N\A Patient Notified: N\A

## 2021-12-29 ENCOUNTER — APPOINTMENT (OUTPATIENT)
Dept: PHYSICAL THERAPY | Facility: REHABILITATION | Age: 31
End: 2021-12-29
Attending: NURSE PRACTITIONER
Payer: MEDICAID

## 2021-12-30 ENCOUNTER — APPOINTMENT (OUTPATIENT)
Dept: PHYSICAL THERAPY | Facility: REHABILITATION | Age: 31
End: 2021-12-30
Attending: NURSE PRACTITIONER
Payer: MEDICAID

## 2022-02-07 ENCOUNTER — TELEPHONE (OUTPATIENT)
Dept: PHYSICAL THERAPY | Facility: REHABILITATION | Age: 32
End: 2022-02-07

## 2022-02-07 NOTE — OP THERAPY DISCHARGE SUMMARY
Outpatient Physical Therapy  DISCHARGE SUMMARY NOTE      [facility] Physical Therapy [facility]  [address]  [address]  [facility] [address]  Phone:  [phone]  Fax:  [phone]    Date of Visit: 02/07/2022    Patient: Chris Boudreaux  YOB: 1990  MRN: [id number]     Referring Provider: HEATH Mendoza  [address] [address]   Referring Diagnosis Neck pain [M54.2];Chronic pain of right ankle [M25.571, G89.29];Chronic bilateral thoracic back pain [M54.6, G89.29];Chronic pain of both shoulders [M25.511, G89.29, M25.512]           Your patient is being discharged from Physical Therapy with the following comments:   · Patient has failed to schedule or reschedule follow-up visits    Comments:  Chris Boudreaux was seen for 7 PT visits between 11/1/21-12/22/21 for neck and shoulder pain. The pt cancelled remaining PT visits following his last apt on 12/22/21 and did not call to reschedule. The pt will be discharged at this time due to the time since his last PT visit.     Limitations Remaining:  Unknown, pt's last visit 12/22/21.     Recommendations:  Recommend that pt request a new PT referral if further PT intervention is needed.     Macy Nettles, PT    Date: 2/7/2022

## 2022-06-27 PROCEDURE — RXMED WILLOW AMBULATORY MEDICATION CHARGE: Performed by: INTERNAL MEDICINE

## 2022-06-29 ENCOUNTER — PHARMACY VISIT (OUTPATIENT)
Dept: PHARMACY | Facility: MEDICAL CENTER | Age: 32
End: 2022-06-29
Payer: COMMERCIAL

## 2022-07-18 ENCOUNTER — PHARMACY VISIT (OUTPATIENT)
Dept: PHARMACY | Facility: MEDICAL CENTER | Age: 32
End: 2022-07-18
Payer: COMMERCIAL

## 2022-07-18 PROCEDURE — RXMED WILLOW AMBULATORY MEDICATION CHARGE: Performed by: INTERNAL MEDICINE

## 2022-07-18 RX ORDER — BNT162B2 0.23 MG/2.25ML
INJECTION, SUSPENSION INTRAMUSCULAR ONCE
Qty: 0.3 ML | Refills: 0 | OUTPATIENT
Start: 2022-07-18 | End: 2022-07-19

## 2022-09-29 ENCOUNTER — APPOINTMENT (OUTPATIENT)
Dept: URGENT CARE | Facility: PHYSICIAN GROUP | Age: 32
End: 2022-09-29
Payer: MEDICAID

## 2022-09-29 ENCOUNTER — OFFICE VISIT (OUTPATIENT)
Dept: URGENT CARE | Facility: CLINIC | Age: 32
End: 2022-09-29
Payer: MEDICAID

## 2022-09-29 VITALS
SYSTOLIC BLOOD PRESSURE: 120 MMHG | DIASTOLIC BLOOD PRESSURE: 78 MMHG | WEIGHT: 232.8 LBS | HEART RATE: 70 BPM | OXYGEN SATURATION: 99 % | RESPIRATION RATE: 12 BRPM | TEMPERATURE: 97.5 F | HEIGHT: 76 IN | BODY MASS INDEX: 28.35 KG/M2

## 2022-09-29 DIAGNOSIS — M25.511 ACUTE PAIN OF RIGHT SHOULDER: ICD-10-CM

## 2022-09-29 PROCEDURE — 99213 OFFICE O/P EST LOW 20 MIN: CPT | Performed by: PHYSICIAN ASSISTANT

## 2022-09-29 NOTE — PROGRESS NOTES
"  Subjective:   Chris Boudreaux is a 32 y.o. male who presents today with   Chief Complaint   Patient presents with    Shoulder Pain     Pt had pain on (R) shoulder        Shoulder Pain  This is a new problem. Episode onset: 4 days. The problem occurs constantly. The problem has been gradually improving. He has tried rest for the symptoms. The treatment provided moderate relief.   Patient has history of cervical spine compression and chronic shoulder pain.  He states it will occasionally flareup and since Monday he has had a flareup but it has significantly improved and he is ready to go back to work and is requesting a note to go back to work at this time.  No recent injury or trauma.  Patient did require physical therapy for this in the past but states it was not really helping much.    PMH:  has no past medical history on file.  MEDS: No current outpatient medications on file.  ALLERGIES:   Allergies   Allergen Reactions    Pcn [Penicillins]      SURGHX: History reviewed. No pertinent surgical history.  SOCHX:  reports that he has never smoked. He has never used smokeless tobacco. He reports that he does not currently use drugs after having used the following drugs: Marijuana, Inhaled, and Oral. Frequency: 7.00 times per week. He reports that he does not drink alcohol.  FH: Reviewed with patient, not pertinent to this visit.       Review of Systems   Musculoskeletal:         Right shoulder pain      Objective:   /78 (BP Location: Left arm, Patient Position: Sitting, BP Cuff Size: Large adult)   Pulse 70   Temp 36.4 °C (97.5 °F) (Temporal)   Resp 12   Ht 1.93 m (6' 4\")   Wt 106 kg (232 lb 12.8 oz)   SpO2 99%   BMI 28.34 kg/m²   Physical Exam  Vitals and nursing note reviewed.   Constitutional:       General: He is not in acute distress.     Appearance: Normal appearance. He is well-developed. He is not ill-appearing or toxic-appearing.   HENT:      Head: Normocephalic and atraumatic.      Right " Ear: Hearing normal.      Left Ear: Hearing normal.   Cardiovascular:      Rate and Rhythm: Normal rate and regular rhythm.      Heart sounds: Normal heart sounds.   Pulmonary:      Effort: Pulmonary effort is normal.      Breath sounds: Normal breath sounds.   Musculoskeletal:      Cervical back: Normal range of motion. Muscular tenderness present. No spinous process tenderness.      Comments: Patient has full strength, range of motion of the right upper extremity.  No bony tenderness to palpation of the shoulder.  Mild tightness to the shoulder but no redness or erythema to the skin.  Neurovascular intact distally.5/5  strength.   Skin:     General: Skin is warm and dry.   Neurological:      Mental Status: He is alert.      Coordination: Coordination normal.   Psychiatric:         Mood and Affect: Mood normal.       Assessment/Plan:   Assessment    1. Acute pain of right shoulder  Times and presentation are consistent with acute flareup of chronic right shoulder pain.  RICE TREATMENT FOR EXTREMITY INJURIES:  R-rest the extremity as much as possible while pain and swelling persist  I-ice the extremity 15 minutes every 2 hours for the first 24 hours, then 4-5 times daily   C-compress the extremity either with splint or ace wrap as directed  E-elevate the extremity to help with swelling    Differential diagnosis, natural history, supportive care, and indications for immediate follow-up discussed.   Patient given instructions and understanding of medications and treatment.    If not improving in 3-5 days, F/U with PCP or return to  if symptoms worsen.    Patient agreeable to plan.      Please note that this dictation was created using voice recognition software. I have made every reasonable attempt to correct obvious errors, but I expect that there are errors of grammar and possibly content that I did not discover before finalizing the note.    Bruno Altamirano PA-C

## 2022-09-29 NOTE — LETTER
September 29, 2022         Patient: Chris Boudreaux   YOB: 1990   Date of Visit: 9/29/2022           To Whom it May Concern:    Chris Boudreaux was seen in my clinic on 9/29/2022.  Please excuse patient's absence.  He can return to work 9/30/2022.    If you have any questions or concerns, please don't hesitate to call.        Sincerely,           Bruno Altamirano P.A.-C.  Electronically Signed

## 2024-11-02 ENCOUNTER — HOSPITAL ENCOUNTER (OUTPATIENT)
Facility: MEDICAL CENTER | Age: 34
End: 2024-11-02
Attending: STUDENT IN AN ORGANIZED HEALTH CARE EDUCATION/TRAINING PROGRAM | Admitting: ORTHOPAEDIC SURGERY

## 2024-11-02 ENCOUNTER — APPOINTMENT (OUTPATIENT)
Dept: RADIOLOGY | Facility: MEDICAL CENTER | Age: 34
End: 2024-11-02
Attending: STUDENT IN AN ORGANIZED HEALTH CARE EDUCATION/TRAINING PROGRAM

## 2024-11-02 VITALS
DIASTOLIC BLOOD PRESSURE: 81 MMHG | HEIGHT: 76 IN | WEIGHT: 230 LBS | OXYGEN SATURATION: 97 % | BODY MASS INDEX: 28.01 KG/M2 | TEMPERATURE: 97.9 F | RESPIRATION RATE: 16 BRPM | HEART RATE: 58 BPM | SYSTOLIC BLOOD PRESSURE: 134 MMHG
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
## 2024-11-02 DIAGNOSIS — R20.0 LEFT UPPER EXTREMITY NUMBNESS: ICD-10-CM

## 2024-11-02 DIAGNOSIS — S40.022A HEMATOMA OF LEFT UPPER EXTREMITY: ICD-10-CM

## 2024-11-02 DIAGNOSIS — R29.898 WEAKNESS OF LEFT UPPER EXTREMITY: ICD-10-CM

## 2024-11-02 LAB
ABO + RH BLD: NORMAL
ABO GROUP BLD: NORMAL
ALBUMIN SERPL BCP-MCNC: 4.3 G/DL (ref 3.2–4.9)
ALBUMIN/GLOB SERPL: 1.7 G/DL
ALP SERPL-CCNC: 100 U/L (ref 30–99)
ALT SERPL-CCNC: 56 U/L (ref 2–50)
ANION GAP SERPL CALC-SCNC: 11 MMOL/L (ref 7–16)
APTT PPP: <20 SEC (ref 24.7–36)
AST SERPL-CCNC: 63 U/L (ref 12–45)
BILIRUB SERPL-MCNC: 0.4 MG/DL (ref 0.1–1.5)
BLD GP AB SCN SERPL QL: NORMAL
BUN SERPL-MCNC: 11 MG/DL (ref 8–22)
CALCIUM ALBUM COR SERPL-MCNC: 8.7 MG/DL (ref 8.5–10.5)
CALCIUM SERPL-MCNC: 8.9 MG/DL (ref 8.5–10.5)
CHLORIDE SERPL-SCNC: 100 MMOL/L (ref 96–112)
CO2 SERPL-SCNC: 24 MMOL/L (ref 20–33)
CREAT SERPL-MCNC: 1.11 MG/DL (ref 0.5–1.4)
ERYTHROCYTE [DISTWIDTH] IN BLOOD BY AUTOMATED COUNT: 47.2 FL (ref 35.9–50)
ETHANOL BLD-MCNC: 180 MG/DL
GFR SERPLBLD CREATININE-BSD FMLA CKD-EPI: 89 ML/MIN/1.73 M 2
GLOBULIN SER CALC-MCNC: 2.6 G/DL (ref 1.9–3.5)
GLUCOSE SERPL-MCNC: 101 MG/DL (ref 65–99)
HCT VFR BLD AUTO: 45.4 % (ref 42–52)
HGB BLD-MCNC: 15.4 G/DL (ref 14–18)
INR PPP: 0.99 (ref 0.87–1.13)
MCH RBC QN AUTO: 33.8 PG (ref 27–33)
MCHC RBC AUTO-ENTMCNC: 33.9 G/DL (ref 32.3–36.5)
MCV RBC AUTO: 99.8 FL (ref 81.4–97.8)
PLATELET # BLD AUTO: 231 K/UL (ref 164–446)
PMV BLD AUTO: 9.8 FL (ref 9–12.9)
POTASSIUM SERPL-SCNC: 4.1 MMOL/L (ref 3.6–5.5)
PROT SERPL-MCNC: 6.9 G/DL (ref 6–8.2)
PROTHROMBIN TIME: 13.1 SEC (ref 12–14.6)
RBC # BLD AUTO: 4.55 M/UL (ref 4.7–6.1)
RH BLD: NORMAL
SODIUM SERPL-SCNC: 135 MMOL/L (ref 135–145)
WBC # BLD AUTO: 7.1 K/UL (ref 4.8–10.8)

## 2024-11-02 PROCEDURE — 86900 BLOOD TYPING SEROLOGIC ABO: CPT

## 2024-11-02 PROCEDURE — 86850 RBC ANTIBODY SCREEN: CPT

## 2024-11-02 PROCEDURE — 82077 ASSAY SPEC XCP UR&BREATH IA: CPT

## 2024-11-02 PROCEDURE — 305948 HCHG GREEN TRAUMA ACT PRE-NOTIFY NO CC

## 2024-11-02 PROCEDURE — 700117 HCHG RX CONTRAST REV CODE 255: Performed by: STUDENT IN AN ORGANIZED HEALTH CARE EDUCATION/TRAINING PROGRAM

## 2024-11-02 PROCEDURE — G0378 HOSPITAL OBSERVATION PER HR: HCPCS

## 2024-11-02 PROCEDURE — 73090 X-RAY EXAM OF FOREARM: CPT | Mod: LT

## 2024-11-02 PROCEDURE — 86901 BLOOD TYPING SEROLOGIC RH(D): CPT

## 2024-11-02 PROCEDURE — 85027 COMPLETE CBC AUTOMATED: CPT

## 2024-11-02 PROCEDURE — 96375 TX/PRO/DX INJ NEW DRUG ADDON: CPT

## 2024-11-02 PROCEDURE — A9270 NON-COVERED ITEM OR SERVICE: HCPCS | Performed by: PHYSICIAN ASSISTANT

## 2024-11-02 PROCEDURE — 90715 TDAP VACCINE 7 YRS/> IM: CPT | Performed by: STUDENT IN AN ORGANIZED HEALTH CARE EDUCATION/TRAINING PROGRAM

## 2024-11-02 PROCEDURE — 36415 COLL VENOUS BLD VENIPUNCTURE: CPT

## 2024-11-02 PROCEDURE — 90471 IMMUNIZATION ADMIN: CPT

## 2024-11-02 PROCEDURE — 700111 HCHG RX REV CODE 636 W/ 250 OVERRIDE (IP): Mod: JZ | Performed by: PHYSICIAN ASSISTANT

## 2024-11-02 PROCEDURE — 96374 THER/PROPH/DIAG INJ IV PUSH: CPT

## 2024-11-02 PROCEDURE — 80053 COMPREHEN METABOLIC PANEL: CPT

## 2024-11-02 PROCEDURE — 99285 EMERGENCY DEPT VISIT HI MDM: CPT

## 2024-11-02 PROCEDURE — A9270 NON-COVERED ITEM OR SERVICE: HCPCS | Performed by: STUDENT IN AN ORGANIZED HEALTH CARE EDUCATION/TRAINING PROGRAM

## 2024-11-02 PROCEDURE — 73206 CT ANGIO UPR EXTRM W/O&W/DYE: CPT | Mod: LT

## 2024-11-02 PROCEDURE — 71045 X-RAY EXAM CHEST 1 VIEW: CPT

## 2024-11-02 PROCEDURE — 85730 THROMBOPLASTIN TIME PARTIAL: CPT

## 2024-11-02 PROCEDURE — 700102 HCHG RX REV CODE 250 W/ 637 OVERRIDE(OP): Performed by: STUDENT IN AN ORGANIZED HEALTH CARE EDUCATION/TRAINING PROGRAM

## 2024-11-02 PROCEDURE — 700102 HCHG RX REV CODE 250 W/ 637 OVERRIDE(OP): Performed by: PHYSICIAN ASSISTANT

## 2024-11-02 PROCEDURE — 85610 PROTHROMBIN TIME: CPT

## 2024-11-02 PROCEDURE — 700111 HCHG RX REV CODE 636 W/ 250 OVERRIDE (IP): Performed by: STUDENT IN AN ORGANIZED HEALTH CARE EDUCATION/TRAINING PROGRAM

## 2024-11-02 RX ORDER — KETOROLAC TROMETHAMINE 15 MG/ML
15 INJECTION, SOLUTION INTRAMUSCULAR; INTRAVENOUS EVERY 6 HOURS PRN
Status: DISCONTINUED | OUTPATIENT
Start: 2024-11-02 | End: 2024-11-02 | Stop reason: HOSPADM

## 2024-11-02 RX ORDER — OXYCODONE HYDROCHLORIDE 15 MG/1
15 TABLET ORAL
Status: DISCONTINUED | OUTPATIENT
Start: 2024-11-02 | End: 2024-11-02 | Stop reason: HOSPADM

## 2024-11-02 RX ORDER — OXYCODONE HYDROCHLORIDE 10 MG/1
10 TABLET ORAL
Status: DISCONTINUED | OUTPATIENT
Start: 2024-11-02 | End: 2024-11-02 | Stop reason: HOSPADM

## 2024-11-02 RX ORDER — OXYCODONE AND ACETAMINOPHEN 5; 325 MG/1; MG/1
1 TABLET ORAL ONCE
Status: COMPLETED | OUTPATIENT
Start: 2024-11-02 | End: 2024-11-02

## 2024-11-02 RX ORDER — OXYCODONE HYDROCHLORIDE 5 MG/1
5 TABLET ORAL
Status: DISCONTINUED | OUTPATIENT
Start: 2024-11-02 | End: 2024-11-02 | Stop reason: HOSPADM

## 2024-11-02 RX ORDER — DOCUSATE SODIUM 100 MG/1
200 CAPSULE, LIQUID FILLED ORAL 2 TIMES DAILY
Qty: 60 CAPSULE | Refills: 0 | Status: SHIPPED | OUTPATIENT
Start: 2024-11-02 | End: 2024-11-02

## 2024-11-02 RX ORDER — HYDROMORPHONE HYDROCHLORIDE 1 MG/ML
0.5 INJECTION, SOLUTION INTRAMUSCULAR; INTRAVENOUS; SUBCUTANEOUS
Status: DISCONTINUED | OUTPATIENT
Start: 2024-11-02 | End: 2024-11-02 | Stop reason: HOSPADM

## 2024-11-02 RX ORDER — OXYCODONE HYDROCHLORIDE 5 MG/1
5-15 TABLET ORAL
Status: DISCONTINUED | OUTPATIENT
Start: 2024-11-02 | End: 2024-11-02

## 2024-11-02 RX ORDER — DIPHENHYDRAMINE HYDROCHLORIDE 50 MG/ML
25 INJECTION INTRAMUSCULAR; INTRAVENOUS ONCE
Status: COMPLETED | OUTPATIENT
Start: 2024-11-02 | End: 2024-11-02

## 2024-11-02 RX ORDER — MORPHINE SULFATE 4 MG/ML
4 INJECTION INTRAVENOUS ONCE
Status: COMPLETED | OUTPATIENT
Start: 2024-11-02 | End: 2024-11-02

## 2024-11-02 RX ORDER — DOCUSATE SODIUM 100 MG/1
200 CAPSULE, LIQUID FILLED ORAL 2 TIMES DAILY
Qty: 60 CAPSULE | Refills: 0 | Status: SHIPPED | OUTPATIENT
Start: 2024-11-02

## 2024-11-02 RX ORDER — DOXYCYCLINE 100 MG/1
100 TABLET ORAL EVERY 12 HOURS
Status: DISCONTINUED | OUTPATIENT
Start: 2024-11-02 | End: 2024-11-02 | Stop reason: HOSPADM

## 2024-11-02 RX ORDER — HYDROMORPHONE HYDROCHLORIDE 1 MG/ML
1 INJECTION, SOLUTION INTRAMUSCULAR; INTRAVENOUS; SUBCUTANEOUS ONCE
Status: COMPLETED | OUTPATIENT
Start: 2024-11-02 | End: 2024-11-02

## 2024-11-02 RX ORDER — OXYCODONE AND ACETAMINOPHEN 10; 325 MG/1; MG/1
1 TABLET ORAL EVERY 4 HOURS PRN
Qty: 42 TABLET | Refills: 0 | Status: SHIPPED | OUTPATIENT
Start: 2024-11-02 | End: 2024-11-02

## 2024-11-02 RX ORDER — OXYCODONE AND ACETAMINOPHEN 10; 325 MG/1; MG/1
1 TABLET ORAL EVERY 4 HOURS PRN
Qty: 42 TABLET | Refills: 0 | Status: SHIPPED | OUTPATIENT
Start: 2024-11-02 | End: 2024-11-09

## 2024-11-02 RX ADMIN — CLOSTRIDIUM TETANI TOXOID ANTIGEN (FORMALDEHYDE INACTIVATED), CORYNEBACTERIUM DIPHTHERIAE TOXOID ANTIGEN (FORMALDEHYDE INACTIVATED), BORDETELLA PERTUSSIS TOXOID ANTIGEN (GLUTARALDEHYDE INACTIVATED), BORDETELLA PERTUSSIS FILAMENTOUS HEMAGGLUTININ ANTIGEN (FORMALDEHYDE INACTIVATED), BORDETELLA PERTUSSIS PERTACTIN ANTIGEN, AND BORDETELLA PERTUSSIS FIMBRIAE 2/3 ANTIGEN 0.5 ML: 5; 2; 2.5; 5; 3; 5 INJECTION, SUSPENSION INTRAMUSCULAR at 01:50

## 2024-11-02 RX ADMIN — DOXYCYCLINE 100 MG: 100 TABLET, FILM COATED ORAL at 08:47

## 2024-11-02 RX ADMIN — DIPHENHYDRAMINE HYDROCHLORIDE 25 MG: 50 INJECTION, SOLUTION INTRAMUSCULAR; INTRAVENOUS at 15:06

## 2024-11-02 RX ADMIN — IOHEXOL 95 ML: 350 INJECTION, SOLUTION INTRAVENOUS at 01:22

## 2024-11-02 RX ADMIN — HYDROMORPHONE HYDROCHLORIDE 1 MG: 1 INJECTION, SOLUTION INTRAMUSCULAR; INTRAVENOUS; SUBCUTANEOUS at 05:18

## 2024-11-02 RX ADMIN — OXYCODONE AND ACETAMINOPHEN 1 TABLET: 5; 325 TABLET ORAL at 01:50

## 2024-11-02 RX ADMIN — OXYCODONE HYDROCHLORIDE 15 MG: 15 TABLET ORAL at 12:06

## 2024-11-02 RX ADMIN — MORPHINE SULFATE 4 MG: 4 INJECTION INTRAVENOUS at 03:37

## 2024-11-02 RX ADMIN — OXYCODONE HYDROCHLORIDE 10 MG: 10 TABLET ORAL at 08:22

## 2024-11-02 ASSESSMENT — ENCOUNTER SYMPTOMS
SHORTNESS OF BREATH: 0
CHILLS: 0
TINGLING: 0
PSYCHIATRIC NEGATIVE: 1
MYALGIAS: 1
ABDOMINAL PAIN: 0
FEVER: 0
VOMITING: 0
BACK PAIN: 0
HEADACHES: 0
EYES NEGATIVE: 1
NECK PAIN: 0
NAUSEA: 0
COUGH: 0
SENSORY CHANGE: 0
CARDIOVASCULAR NEGATIVE: 1
WEAKNESS: 0

## 2024-11-02 ASSESSMENT — PAIN DESCRIPTION - PAIN TYPE
TYPE: ACUTE PAIN
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
## 2024-11-02 NOTE — PROGRESS NOTES
Patient arrived to discharge lounge. PIV removed, tip intact. Discussed discharge paperwork and medications with patient and family. Answered all questions. No home meds to return. Patient escorted out with family via wheelchair, personal belongings in hand.
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
## 2024-11-02 NOTE — ED NOTES
Bedside report received from off going RN/tech: Viviane assumed care of patient.  POC discussed with patient. Call light within reach, all needs addressed at this time.       Fall risk interventions in place: Patient's personal possessions are with in their safe reach, Place socks on patient, Give patient urinal if applicable, Keep floor surfaces clean and dry, and Accompanied to restroom (all applicable per Jadwin Fall risk assessment)   Continuous monitoring: Pulse Ox or Blood Pressure  IVF/IV medications: Not Applicable   Oxygen: Room Air  Bedside sitter: Not Applicable   Isolation: Not Applicable
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
## 2024-11-02 NOTE — PROGRESS NOTES
Patients VSS, no complaints at this time, educated patient on discharge instructions and discharge lounge protocol. Work note given to patient, educated that narcotic prescription was sent to [facility] in [place]. Patient and family verbalized understanding.
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
## 2024-11-02 NOTE — ED NOTES
Medication history reviewed with PT at bedside    [facility] is complete per PT reporting    Allergies reviewed.     Patient denies any outpatient antibiotics in the last 30 days.     Patient is not taking anticoagulants.    PT reports that he has not taken any prescription medications, no OTC medications and no supplements in the past 30 days.    Preferred pharmacy for this visit - [facility] on [address] ([phone])

## 2024-11-02 NOTE — CARE PLAN
The patient is Stable - Low risk of patient condition declining or worsening    Shift Goals  Clinical Goals: Pain management, comfort, rest, plan of care  Patient Goals: Food, pain management, rest    Progress made toward(s) clinical / shift goals:    Problem: Pain - Standard  Goal: Alleviation of pain or a reduction in pain to the patient’s comfort goal  11/2/2024 1554 by Wilner Jeffrey R.N.  Outcome: Met  11/2/2024 1554 by Wilner Jeffrey R.N.  Outcome: Progressing     Problem: Knowledge Deficit - Standard  Goal: Patient and family/care givers will demonstrate understanding of plan of care, disease process/condition, diagnostic tests and medications  11/2/2024 1554 by Wilner Jeffrey R.N.  Outcome: Met  11/2/2024 1554 by Wilner Jeffrey, R.N.  Outcome: Progressing       Patient is not progressing towards the following goals:

## 2024-11-02 NOTE — PROGRESS NOTES
Patient complaining of generalized itching, stated narcotics have caused him to itch before. Notified Eduardo CABRALES, who stated to administer 25 mg IV benadryl.     1530: Patient states improvement of itching.

## 2024-11-06 LAB — COMPONENT CELLULAR 8504CLL: NORMAL
